# Patient Record
Sex: MALE | Race: WHITE | NOT HISPANIC OR LATINO | Employment: FULL TIME | URBAN - METROPOLITAN AREA
[De-identification: names, ages, dates, MRNs, and addresses within clinical notes are randomized per-mention and may not be internally consistent; named-entity substitution may affect disease eponyms.]

---

## 2017-08-25 ENCOUNTER — OFFICE VISIT (OUTPATIENT)
Dept: MEDICAL GROUP | Facility: PHYSICIAN GROUP | Age: 52
End: 2017-08-25
Payer: COMMERCIAL

## 2017-08-25 VITALS
WEIGHT: 193 LBS | TEMPERATURE: 98.2 F | HEART RATE: 83 BPM | RESPIRATION RATE: 12 BRPM | OXYGEN SATURATION: 95 % | HEIGHT: 72 IN | DIASTOLIC BLOOD PRESSURE: 82 MMHG | SYSTOLIC BLOOD PRESSURE: 130 MMHG | BODY MASS INDEX: 26.14 KG/M2

## 2017-08-25 DIAGNOSIS — Z12.11 SCREENING FOR COLON CANCER: ICD-10-CM

## 2017-08-25 DIAGNOSIS — J30.2 SEASONAL ALLERGIC RHINITIS, UNSPECIFIED ALLERGIC RHINITIS TRIGGER: ICD-10-CM

## 2017-08-25 DIAGNOSIS — E55.9 VITAMIN D DEFICIENCY: ICD-10-CM

## 2017-08-25 DIAGNOSIS — E11.9 TYPE 2 DIABETES MELLITUS WITHOUT COMPLICATION, WITHOUT LONG-TERM CURRENT USE OF INSULIN (HCC): ICD-10-CM

## 2017-08-25 DIAGNOSIS — E78.5 DYSLIPIDEMIA: ICD-10-CM

## 2017-08-25 DIAGNOSIS — G47.33 OSA (OBSTRUCTIVE SLEEP APNEA): ICD-10-CM

## 2017-08-25 DIAGNOSIS — E53.8 VITAMIN B12 DEFICIENCY: ICD-10-CM

## 2017-08-25 DIAGNOSIS — F41.9 ANXIETY: ICD-10-CM

## 2017-08-25 PROCEDURE — 99204 OFFICE O/P NEW MOD 45 MIN: CPT | Performed by: INTERNAL MEDICINE

## 2017-08-25 RX ORDER — ATORVASTATIN CALCIUM 40 MG/1
40 TABLET, FILM COATED ORAL DAILY
Qty: 90 TAB | Refills: 3 | Status: SHIPPED | OUTPATIENT
Start: 2017-08-25

## 2017-08-25 RX ORDER — TRIAMCINOLONE ACETONIDE 55 UG/1
2 SPRAY, METERED NASAL DAILY
COMMUNITY

## 2017-08-25 RX ORDER — VENLAFAXINE 75 MG/1
75 TABLET ORAL DAILY
Qty: 90 TAB | Refills: 3 | Status: SHIPPED | OUTPATIENT
Start: 2017-08-25 | End: 2018-05-18 | Stop reason: SDUPTHER

## 2017-08-25 RX ORDER — FEXOFENADINE HCL 180 MG/1
180 TABLET ORAL DAILY
COMMUNITY

## 2017-08-25 RX ORDER — OLOPATADINE HYDROCHLORIDE 665 UG/1
1 SPRAY NASAL 2 TIMES DAILY
Qty: 3 BOTTLE | Refills: 3 | Status: SHIPPED | OUTPATIENT
Start: 2017-08-25

## 2017-08-25 RX ORDER — VENLAFAXINE 75 MG/1
75 TABLET ORAL 3 TIMES DAILY
COMMUNITY
End: 2017-08-25 | Stop reason: SDUPTHER

## 2017-08-25 RX ORDER — OLOPATADINE HYDROCHLORIDE 665 UG/1
SPRAY NASAL
COMMUNITY
End: 2017-08-25 | Stop reason: SDUPTHER

## 2017-08-25 RX ORDER — HYDROCORTISONE ACETATE 0.5 %
2 CREAM (GRAM) TOPICAL 2 TIMES DAILY
COMMUNITY

## 2017-08-25 RX ORDER — ATORVASTATIN CALCIUM 40 MG/1
40 TABLET, FILM COATED ORAL NIGHTLY
COMMUNITY
End: 2017-08-25 | Stop reason: SDUPTHER

## 2017-08-25 RX ORDER — LANOLIN ALCOHOL/MO/W.PET/CERES
1000 CREAM (GRAM) TOPICAL DAILY
COMMUNITY

## 2017-08-25 ASSESSMENT — PATIENT HEALTH QUESTIONNAIRE - PHQ9: CLINICAL INTERPRETATION OF PHQ2 SCORE: 0

## 2017-08-25 NOTE — PROGRESS NOTES
Chief Complaint   Patient presents with   • New Patient     establish care, med refills         HISTORY OF THE PRESENT ILLNESS: Patient is a 52 y.o. male. This pleasant patient is here today for DLD, alleriges, T2DM, anxiety, vitamin B12 deficiency    Dyslipidemia  Pt is on atorvastatin 40 mg daily and ASA daily for this. Patient is taking medication as prescribed. Patient denies myalgias. No history of MI or CVA.    Seasonal allergies  Pt has known seasonal allergies. He was being seen by ENT for this in the past (moved here from [place] in April of this year) Pt is on nasacort and olopatadine nasal sprays for this. He has had 2 sinus surgeries for this in the past. Since moving to this area he is doing fairly well. He takes allegra 4 times per week as an adjunct.     Type 2 diabetes mellitus without complication, without long-term current use of insulin (CMS-[facility])  Pt states he was diagnosed with T2DM by his last provider due to an A1C of 6.5%. He states that he was started on metformin 500 mg daily and had improvement in labs. He states he ran out of medication in March (5 months ago).     Pt reports he gets occasional neuropathy but this was attributed to vitamin B12 deficiency. He denies blurry vision, increased urinary frequency. Pt is on baby aspirin, statin.     Anxiety  Pt has known anxiety. He is on venlafaxine for this and has been on it for 10 years. It works well for him and he has difficulty when he misses a dose. Denies suicidal ideation.     Vitamin B12 deficiency  Pt is on vitamin B12 supplementation for diagnosis of vitamin B12 deficiency.       Allergies: Review of patient's allergies indicates no known allergies.    Current Outpatient Prescriptions Ordered in [facility]   Medication Sig Dispense Refill   • triamcinolone (NASACORT ALLERGY 24HR) 55 MCG/ACT nasal inhaler Spray 2 Sprays in nose every day.     • Glucosamine-Chondroit-Vit C-Mn (GLUCOSAMINE CHONDR 1500 COMPLX) Cap Take 2 Caps by mouth 2 Times  "a Day.     • Multiple Vitamin (MULTI VITAMIN) Tab Take  by mouth.     • fexofenadine (ALLEGRA) 180 MG tablet Take 180 mg by mouth every day.     • aspirin EC (ECOTRIN) 81 MG Tablet Delayed Response Take 81 mg by mouth every day.     • Cyanocobalamin (VITAMIN B-12) 1000 MCG Tab Take 1,000 mcg by mouth every day.     • vitamin D (CHOLECALCIFEROL) 1000 UNIT Tab Take 1,000 Units by mouth every day.     • atorvastatin (LIPITOR) 40 MG Tab Take 1 Tab by mouth every day. 90 Tab 3   • metformin (GLUCOPHAGE) 500 MG Tab Take 1 Tab by mouth every day. 90 Tab 3   • venlafaxine (EFFEXOR) 75 MG Tab Take 1 Tab by mouth every day. 90 Tab 3   • Olopatadine HCl 0.6 % Solution Spray 1 Spray in nose 2 Times a Day. 3 Bottle 3     No current Epic-ordered facility-administered medications on file.       Past Medical History   Diagnosis Date   • Hyperlipidemia    • Anxiety    • Allergy        Past Surgical History   Procedure Laterality Date   • Other       sinus surgery X 2   • Laminotomy         Social History   Substance Use Topics   • Smoking status: Never Smoker    • Smokeless tobacco: Never Used   • Alcohol Use: Yes       Family History   Problem Relation Age of Onset   • Dementia Mother    • Heart Disease Father    • Psychiatry Sister      bipolar   • GI Brother      Crohn's       Review of Systems :    Denies suicidal ideation, denies myalgia    All other systems reviewed and are negative except as in HPI.      Exam: Blood pressure 130/82, pulse 83, temperature 36.8 °C (98.2 °F), resp. rate 12, height 1.829 m (6' 0.01\"), weight 87.544 kg (193 lb), SpO2 95 %.    Blood pressure 130/82, pulse 83, temperature 36.8 °C (98.2 °F), resp. rate 12, height 1.829 m (6' 0.01\"), weight 87.544 kg (193 lb), SpO2 95 %. Body mass index is 26.17 kg/(m^2).   Physical Exam:  Constitutional: Alert & oriented, no acute distress  Eye: Equal, round and reactive, conjunctiva clear, lids normal  ENMT: Lips without lesions, good dentition, oropharynx " clear  Neck: Trachea midline, no masses, no thyromegaly. No cervical or supraclavicular lymphadenopathy  Respiratory: Unlabored respiratory effort, lungs clear to auscultation, no wheezes, no ronchi  Cardiovascular: Normal S1, S2, no murmur, no edema  Skin: Warm, dry, good turgor, no rashes in visible areas  Neuro: No overt focal neurologic deficits  Psych: Normal mood and affect, judgement normal    Assessment/Plan  1. Dyslipidemia  Continue atorvastatin and will check lab  - atorvastatin (LIPITOR) 40 MG Tab; Take 1 Tab by mouth every day.  Dispense: 90 Tab; Refill: 3  - LIPID PROFILE; Future    2. Seasonal allergic rhinitis, unspecified allergic rhinitis trigger  Well controlled on current regimen of nasacort, opapatadine, and allegra. Continue current regimen. If symptoms worsen will consider ENT referral  - Olopatadine HCl 0.6 % Solution; Spray 1 Spray in nose 2 Times a Day.  Dispense: 3 Bottle; Refill: 3    3. Anxiety  Well controlled. Continue venlafaxine. Denies suicidal ideation  - venlafaxine (EFFEXOR) 75 MG Tab; Take 1 Tab by mouth every day.  Dispense: 90 Tab; Refill: 3    4. Type 2 diabetes mellitus without complication, without long-term current use of insulin (CMS-HCC)  Will get labs to further assess and restart patient on metformin. Will repeat A1C in 3 months (pt will have been back on medication) and follow up shortly after  - metformin (GLUCOPHAGE) 500 MG Tab; Take 1 Tab by mouth every day.  Dispense: 90 Tab; Refill: 3  - CBC WITH DIFFERENTIAL; Future  - COMP METABOLIC PANEL; Future  - MICROALBUMIN CREAT RATIO URINE; Future  - HEMOGLOBIN A1C; Future  - HEMOGLOBIN A1C; Future    5. Vitamin B12 deficiency  Continue supplement and check lab  - VITAMIN B12; Future    6. RIDDHI (obstructive sleep apnea)  Will refer to pulmonary  - REFERRAL TO PULMONOLOGY    7. Vitamin D deficiency  Continue supplement and check lab  - VITAMIN D,25 HYDROXY; Future    8. Screening for colon cancer  - REFERRAL TO GI FOR  COLONOSCOPY      Return in about 3 months (around 11/25/2017).    Please note that this dictation was created using voice recognition software. I have made every reasonable attempt to correct obvious errors, but I expect that there are errors of grammar and possibly content that I did not discover before finalizing the note.

## 2017-08-25 NOTE — ASSESSMENT & PLAN NOTE
Pt has known seasonal allergies. He was being seen by ENT for this in the past (moved here from [place] in April of this year) Pt is on nasacort and olopatadine nasal sprays for this. He has had 2 sinus surgeries for this in the past. Since moving to this area he is doing fairly well. He takes allegra 4 times per week as an adjunct.

## 2017-08-25 NOTE — ASSESSMENT & PLAN NOTE
Pt states he was diagnosed with T2DM by his last provider due to an A1C of 6.5%. He states that he was started on metformin 500 mg daily and had improvement in labs. He states he ran out of medication in March (5 months ago).     Pt reports he gets occasional neuropathy but this was attributed to vitamin B12 deficiency. He denies blurry vision, increased urinary frequency. Pt is on baby aspirin, statin.

## 2017-08-25 NOTE — Clinical Note
[facility]  Samy Quinteros M.D.  [address]  [place] [address]  Fax: [phone]   Authorization for Release/Disclosure of   Protected Health Information   Name: SHAYY WANG : 1965 SSN: [id number]   Address: [address] [place] [address] [place] [address] Phone:    [phone] (home)    I authorize the entity listed below to release/disclose the PHI below to:   [facility]/Samy Quinteros M.D. and Samy Quinteros M.D.   Provider or Entity Name:  Jorge L Aguayo,    Address   [facility], Zip  [address], [place] [address] Phone:  [phone]    Fax:     Reason for request: continuity of care   Information to be released:    [  ] LAST COLONOSCOPY,  including any PATH REPORT and follow-up  [  ] LAST FIT/COLOGUARD RESULT [  ] LAST DEXA  [  ] LAST MAMMOGRAM  [  ] LAST PAP  [  ] LAST LABS [  ] RETINA EXAM REPORT  [  ] IMMUNIZATION RECORDS  [  ] Release all info      [  ] Check here and initial the line next to each item to release ALL health information INCLUDING  _____ Care and treatment for drug and / or alcohol abuse  _____ HIV testing, infection status, or AIDS  _____ Genetic Testing    DATES OF SERVICE OR TIME PERIOD TO BE DISCLOSED: _____________  I understand and acknowledge that:  * This Authorization may be revoked at any time by you in writing, except if your health information has already been used or disclosed.  * Your health information that will be used or disclosed as a result of you signing this authorization could be re-disclosed by the recipient. If this occurs, your re-disclosed health information may no longer be protected by State or Federal laws.  * You may refuse to sign this Authorization. Your refusal will not affect your ability to obtain treatment.  * This Authorization becomes effective upon signing and will  on (date) __________.      If no date is indicated, this Authorization will  one (1) year from the signature date.    Name: Shayy  Kirk    Signature:   Date:     8/25/2017       PLEASE FAX REQUESTED RECORDS BACK TO: [phone]

## 2017-08-25 NOTE — ASSESSMENT & PLAN NOTE
Pt has known anxiety. He is on venlafaxine for this and has been on it for 10 years. It works well for him and he has difficulty when he misses a dose. Denies suicidal ideation.

## 2017-08-25 NOTE — ASSESSMENT & PLAN NOTE
Pt is on atorvastatin 40 mg daily and ASA daily for this. Patient is taking medication as prescribed. Patient denies myalgias. No history of MI or CVA.

## 2017-09-02 LAB
25(OH)D3+25(OH)D2 SERPL-MCNC: 23.1 NG/ML (ref 30–100)
ALBUMIN SERPL-MCNC: 4.5 G/DL (ref 3.5–5.5)
ALBUMIN/CREAT UR: 4.1 MG/G CREAT (ref 0–30)
ALBUMIN/GLOB SERPL: 1.5 {RATIO} (ref 1.2–2.2)
ALP SERPL-CCNC: 65 IU/L (ref 39–117)
ALT SERPL-CCNC: 17 IU/L (ref 0–44)
AST SERPL-CCNC: 18 IU/L (ref 0–40)
BASOPHILS # BLD AUTO: 0 X10E3/UL (ref 0–0.2)
BASOPHILS NFR BLD AUTO: 0 %
BILIRUB SERPL-MCNC: 0.5 MG/DL (ref 0–1.2)
BUN SERPL-MCNC: 14 MG/DL (ref 6–24)
BUN/CREAT SERPL: 14 (ref 9–20)
CALCIUM SERPL-MCNC: 9.7 MG/DL (ref 8.7–10.2)
CHLORIDE SERPL-SCNC: 98 MMOL/L (ref 96–106)
CHOLEST SERPL-MCNC: 251 MG/DL (ref 100–199)
CO2 SERPL-SCNC: 24 MMOL/L (ref 18–29)
COMMENT 011824: ABNORMAL
CREAT SERPL-MCNC: 1.01 MG/DL (ref 0.76–1.27)
CREAT UR-MCNC: 115.4 MG/DL
EOSINOPHIL # BLD AUTO: 0 X10E3/UL (ref 0–0.4)
EOSINOPHIL NFR BLD AUTO: 1 %
ERYTHROCYTE [DISTWIDTH] IN BLOOD BY AUTOMATED COUNT: 13.9 % (ref 12.3–15.4)
GLOBULIN SER CALC-MCNC: 3 G/DL (ref 1.5–4.5)
GLUCOSE SERPL-MCNC: 123 MG/DL (ref 65–99)
HBA1C MFR BLD: 6.7 % (ref 4.8–5.6)
HCT VFR BLD AUTO: 45.9 % (ref 37.5–51)
HDLC SERPL-MCNC: 42 MG/DL
HGB BLD-MCNC: 14.7 G/DL (ref 12.6–17.7)
IMM GRANULOCYTES # BLD: 0 X10E3/UL (ref 0–0.1)
IMM GRANULOCYTES NFR BLD: 0 %
IMMATURE CELLS  115398: NORMAL
LDLC SERPL CALC-MCNC: 159 MG/DL (ref 0–99)
LYMPHOCYTES # BLD AUTO: 1.8 X10E3/UL (ref 0.7–3.1)
LYMPHOCYTES NFR BLD AUTO: 36 %
MCH RBC QN AUTO: 28.1 PG (ref 26.6–33)
MCHC RBC AUTO-ENTMCNC: 32 G/DL (ref 31.5–35.7)
MCV RBC AUTO: 88 FL (ref 79–97)
MICROALBUMIN UR-MCNC: 4.7 UG/ML
MONOCYTES # BLD AUTO: 0.5 X10E3/UL (ref 0.1–0.9)
MONOCYTES NFR BLD AUTO: 9 %
MORPHOLOGY BLD-IMP: NORMAL
NEUTROPHILS # BLD AUTO: 2.7 X10E3/UL (ref 1.4–7)
NEUTROPHILS NFR BLD AUTO: 54 %
NRBC BLD AUTO-RTO: NORMAL %
PLATELET # BLD AUTO: 246 X10E3/UL (ref 150–379)
POTASSIUM SERPL-SCNC: 4.3 MMOL/L (ref 3.5–5.2)
PROT SERPL-MCNC: 7.5 G/DL (ref 6–8.5)
RBC # BLD AUTO: 5.24 X10E6/UL (ref 4.14–5.8)
SODIUM SERPL-SCNC: 139 MMOL/L (ref 134–144)
TRIGL SERPL-MCNC: 249 MG/DL (ref 0–149)
VIT B12 SERPL-MCNC: 983 PG/ML (ref 211–946)
VLDLC SERPL CALC-MCNC: 50 MG/DL (ref 5–40)
WBC # BLD AUTO: 5 X10E3/UL (ref 3.4–10.8)

## 2017-09-05 ENCOUNTER — TELEPHONE (OUTPATIENT)
Dept: MEDICAL GROUP | Facility: PHYSICIAN GROUP | Age: 52
End: 2017-09-05

## 2017-09-05 NOTE — TELEPHONE ENCOUNTER
----- Message from Samy Quinteros M.D. sent at 9/4/2017  3:37 PM PDT -----  Labs confirm diabetes mellitus and high cholesterol. I recommend continuing current medications and trying to improve diet and get regular exercise. Labs also show a low vitamin D level, supplementation with vitamin D 2000 IU daily would help with this. If pt would like we can send a prescription in for this as well. Please call patient and inform them. Thank you  - Dr. Quinteros

## 2017-09-29 ENCOUNTER — OFFICE VISIT (OUTPATIENT)
Dept: MEDICAL GROUP | Facility: PHYSICIAN GROUP | Age: 52
End: 2017-09-29
Payer: COMMERCIAL

## 2017-09-29 VITALS
RESPIRATION RATE: 16 BRPM | HEART RATE: 98 BPM | TEMPERATURE: 97.2 F | BODY MASS INDEX: 26.82 KG/M2 | DIASTOLIC BLOOD PRESSURE: 90 MMHG | HEIGHT: 72 IN | SYSTOLIC BLOOD PRESSURE: 168 MMHG | OXYGEN SATURATION: 75 % | WEIGHT: 198 LBS

## 2017-09-29 DIAGNOSIS — J01.10 ACUTE NON-RECURRENT FRONTAL SINUSITIS: ICD-10-CM

## 2017-09-29 PROCEDURE — 99214 OFFICE O/P EST MOD 30 MIN: CPT | Performed by: NURSE PRACTITIONER

## 2017-09-29 RX ORDER — PROMETHAZINE HYDROCHLORIDE AND CODEINE PHOSPHATE 6.25; 1 MG/5ML; MG/5ML
5 SYRUP ORAL 4 TIMES DAILY PRN
Qty: 120 ML | Refills: 0 | Status: SHIPPED
Start: 2017-09-29 | End: 2017-12-15

## 2017-09-29 RX ORDER — CEFDINIR 300 MG/1
300 CAPSULE ORAL 2 TIMES DAILY
Qty: 14 CAP | Refills: 0 | Status: SHIPPED | OUTPATIENT
Start: 2017-09-29 | End: 2017-12-15

## 2017-09-29 NOTE — PROGRESS NOTES
Chief Complaint   Patient presents with   • Sinus Problem     x 5 days       HPI:      Osvaldo Bradley is a 52 y.o. male here today for sinus infection. Symptoms started Monday with sore throat, and Tuesday thick drainage started. He has dull headache and malaise. Cough is severe at night. Sore throat persists, nasal congestion and frontal headache persist, and he feels unwell. States symptoms are worsening.   Denies any F/C. Denies sob, dyspnea, wheezing.    Treatments tried: neomed sinus rinse, OTC Sudafed or multi symptom day time cold. Also uses Nasacort and Zyprexa daily chronically with Allegra 4 days a week. He does have history of 2 sinus surgeries, last in 2010. Has not had sinus infection for about 2 years that needed to be treated with antibiotics.     Current medicines (including changes today)  Current Outpatient Prescriptions   Medication Sig Dispense Refill   • cefdinir (OMNICEF) 300 MG Cap Take 1 Cap by mouth 2 times a day. 14 Cap 0   • promethazine-codeine (PHENERGAN-CODEINE) 6.25-10 MG/5ML Syrup Take 5 mL by mouth 4 times a day as needed for Cough. 120 mL 0   • triamcinolone (NASACORT ALLERGY 24HR) 55 MCG/ACT nasal inhaler Spray 2 Sprays in nose every day.     • Glucosamine-Chondroit-Vit C-Mn (GLUCOSAMINE CHONDR 1500 COMPLX) Cap Take 2 Caps by mouth 2 Times a Day.     • Multiple Vitamin (MULTI VITAMIN) Tab Take  by mouth.     • fexofenadine (ALLEGRA) 180 MG tablet Take 180 mg by mouth every day.     • aspirin EC (ECOTRIN) 81 MG Tablet Delayed Response Take 81 mg by mouth every day.     • Cyanocobalamin (VITAMIN B-12) 1000 MCG Tab Take 1,000 mcg by mouth every day.     • vitamin D (CHOLECALCIFEROL) 1000 UNIT Tab Take 1,000 Units by mouth every day.     • atorvastatin (LIPITOR) 40 MG Tab Take 1 Tab by mouth every day. 90 Tab 3   • metformin (GLUCOPHAGE) 500 MG Tab Take 1 Tab by mouth every day. 90 Tab 3   • venlafaxine (EFFEXOR) 75 MG Tab Take 1 Tab by mouth every day. 90 Tab 3   • Olopatadine HCl  0.6 % Solution Spray 1 Spray in nose 2 Times a Day. 3 Bottle 3     No current facility-administered medications for this visit.      He  has a past medical history of Allergy; Anxiety; and Hyperlipidemia.  He  has a past surgical history that includes other and laminotomy.  Social History   Substance Use Topics   • Smoking status: Never Smoker   • Smokeless tobacco: Never Used   • Alcohol use Yes     Social History     Social History Narrative   • No narrative on file     Family History   Problem Relation Age of Onset   • Dementia Mother    • Heart Disease Father    • Psychiatry Sister      bipolar   • GI Brother      Crohn's     Family Status   Relation Status   • Mother    • Father    • Sister Alive   • Brother Alive     Health Maintenance Topics with due status: Overdue       Topic Date Due    IMM HEP B VACCINE 1965    DIABETES MONOFILAMENT / LE EXAM 1963    RETINAL SCREENING 1981    IMM DTaP/Tdap/Td Vaccine 1982    IMM PNEUMOCOCCAL 19-64 (ADULT) MEDIUM RISK SERIES 1982    COLONOSCOPY 2013    IMM INFLUENZA 2017        ROS  As stated in HPI      Objective:     Blood pressure (!) 168/90, pulse 98, temperature 36.2 °C (97.2 °F), resp. rate 16, height 1.829 m (6'), weight 89.8 kg (198 lb), SpO2 (!) 75 %. Body mass index is 26.85 kg/m².  Physical Exam:  Alert, oriented in no acute distress.  Eye contact is good, speech goal directed, affect bright.  HEENT: EOMI, conjunctiva non-injected, sclera non-icteric. No lid edema or eye drainage  Nares patent with mild edema and erythema. No discharge visible.   TTP over frontal sinuses.   Gross hearing intact   Chelsie pinna, external auditory canals, TM pearly gray with normal light reflex of right, left TM with effusion and decreased light reflex.  Oral mucous membranes pink and moist with no lesions. Oropharynx with erythema. No exudate  Neck: supple with no cervical or supraclavicular lymphadenopathy  Lungs:  unlabored, clear to auscultation bilaterally with good excursion.  CV: regular rate and rhythm  Skin: No rashes or lesions in visible areas        Assessment and Plan:   Assessment/Plan:  1. Acute non-recurrent frontal sinusitis  New, acute problem. D/t hx of chronic sinus issues and severity of current infection, will tx with antibiotics. Enc rest, hydration, Mucinex, and cough medication. Continue nasal sprays and steam inhalation    - cefdinir (OMNICEF) 300 MG Cap; Take 1 Cap by mouth 2 times a day.  Dispense: 14 Cap; Refill: 0  - promethazine-codeine (PHENERGAN-CODEINE) 6.25-10 MG/5ML Syrup; Take 5 mL by mouth 4 times a day as needed for Cough.  Dispense: 120 mL; Refill: 0       Follow up:  Return in about 1 week (around 10/6/2017), or if not improved.    Educated in proper administration of medication(s) ordered today including safety, possible SE, risks, benefits, rationale and alternatives to therapy.   Supportive care, differential diagnoses, and indications for immediate follow-up discussed with patient.    Pathogenesis of diagnosis discussed including typical length and natural progression.    Instructed to return to clinic or nearest emergency department for any change in condition, further concerns, or worsening of symptoms.  Patient states understanding of the plan of care and discharge instructions.    Please note that this dictation was created using voice recognition software. I have made every reasonable attempt to correct obvious errors, but I expect that there are errors of grammar and possibly content that I did not discover before finalizing the note.    Followup: Return in about 1 week (around 10/6/2017), or if not improved. sooner should new symptoms or problems arise.

## 2017-12-09 LAB — HBA1C MFR BLD: 6.6 % (ref 4.8–5.6)

## 2017-12-15 ENCOUNTER — SLEEP CENTER VISIT (OUTPATIENT)
Dept: SLEEP MEDICINE | Facility: MEDICAL CENTER | Age: 52
End: 2017-12-15
Payer: COMMERCIAL

## 2017-12-15 ENCOUNTER — OFFICE VISIT (OUTPATIENT)
Dept: MEDICAL GROUP | Facility: PHYSICIAN GROUP | Age: 52
End: 2017-12-15
Payer: COMMERCIAL

## 2017-12-15 VITALS
TEMPERATURE: 96.6 F | BODY MASS INDEX: 26.95 KG/M2 | OXYGEN SATURATION: 95 % | WEIGHT: 199 LBS | DIASTOLIC BLOOD PRESSURE: 80 MMHG | SYSTOLIC BLOOD PRESSURE: 140 MMHG | HEART RATE: 76 BPM | RESPIRATION RATE: 12 BRPM | HEIGHT: 72 IN

## 2017-12-15 VITALS
BODY MASS INDEX: 26.68 KG/M2 | DIASTOLIC BLOOD PRESSURE: 100 MMHG | RESPIRATION RATE: 18 BRPM | OXYGEN SATURATION: 95 % | HEIGHT: 72 IN | HEART RATE: 71 BPM | WEIGHT: 197 LBS | SYSTOLIC BLOOD PRESSURE: 140 MMHG | TEMPERATURE: 97.1 F

## 2017-12-15 DIAGNOSIS — Z23 NEED FOR VACCINATION: ICD-10-CM

## 2017-12-15 DIAGNOSIS — E78.5 DYSLIPIDEMIA: ICD-10-CM

## 2017-12-15 DIAGNOSIS — E11.9 TYPE 2 DIABETES MELLITUS WITHOUT COMPLICATION, WITHOUT LONG-TERM CURRENT USE OF INSULIN (HCC): ICD-10-CM

## 2017-12-15 DIAGNOSIS — G47.33 OBSTRUCTIVE SLEEP APNEA SYNDROME: ICD-10-CM

## 2017-12-15 DIAGNOSIS — J32.9 RECURRENT SINUS INFECTIONS: ICD-10-CM

## 2017-12-15 PROCEDURE — 99214 OFFICE O/P EST MOD 30 MIN: CPT | Mod: 25 | Performed by: NURSE PRACTITIONER

## 2017-12-15 PROCEDURE — 90686 IIV4 VACC NO PRSV 0.5 ML IM: CPT | Performed by: NURSE PRACTITIONER

## 2017-12-15 PROCEDURE — 99244 OFF/OP CNSLTJ NEW/EST MOD 40: CPT | Performed by: INTERNAL MEDICINE

## 2017-12-15 PROCEDURE — 90471 IMMUNIZATION ADMIN: CPT | Performed by: NURSE PRACTITIONER

## 2017-12-15 NOTE — ASSESSMENT & PLAN NOTE
Recurrent problem. Has had two sinus infections this year since moving to [place] in September and November. has hx of 2 sinus surgeries in the past for nasal polyps and sinusotomy.   He is on allegra 4 days a week. On olopatadine twice daily and nasocort once daily. Previously was on slit therapy not helping.

## 2017-12-15 NOTE — ASSESSMENT & PLAN NOTE
States he has some noticed some muscular pain since restarting atorvastatin. He did not notice it in the past, but this time around he does. It specifically occurs in the lower back. He tried cutting dose in half on his own about 1.5 month ago and has noticed some improved   Ref. Range 9/1/2017 08:21   Cholesterol,Tot Latest Ref Range: 100 - 199 mg/dL 251 (H)   Triglycerides Latest Ref Range: 0 - 149 mg/dL 249 (H)   HDL Latest Ref Range: >39 mg/dL 42   LDL Latest Ref Range: 0 - 99 mg/dL 159 (H)   VLDL Cholesterol Calc Latest Ref Range: 5 - 40 mg/dL 50 (H)

## 2017-12-15 NOTE — ASSESSMENT & PLAN NOTE
Controlled: yes  Medications: metformin 500 mg once daily for the past 4 months, although A1C has not really improved  Glucose at home: not monitoring   Hypoglycemia episodes: none  Statin: yes  ACEI/ARB: no  Aspirin: yes  Exercise: none  Nutrition counseling last: 2016, which did help him to learn how to carb count  Denies any polyuria, polydipsia, polyphagia, blurred or cloudy vision, rash or thrush, or numbness or tingling of feet.    Ref. Range 9/1/2017 08:21 12/8/2017 10:17   Glycohemoglobin Latest Ref Range: 4.8 - 5.6 % 6.7 (H) 6.6 (H)

## 2017-12-19 NOTE — PROGRESS NOTES
Subjective:     Chief Complaint   Patient presents with   • Follow-Up     3 months       HPI  Osvaldo Bradley is a 52 y.o. male here today for diabetes f/u     Type 2 diabetes mellitus without complication, without long-term current use of insulin (CMS-HCC)  Controlled: yes  Medications: metformin 500 mg once daily for the past 4 months, although A1C has not really improved  Glucose at home: not monitoring   Hypoglycemia episodes: none  Statin: yes  ACEI/ARB: no  Aspirin: yes  Exercise: none  Nutrition counseling last: 2016, which did help him to learn how to carb count  Denies any polyuria, polydipsia, polyphagia, blurred or cloudy vision, rash or thrush, or numbness or tingling of feet.    Ref. Range 9/1/2017 08:21 12/8/2017 10:17   Glycohemoglobin Latest Ref Range: 4.8 - 5.6 % 6.7 (H) 6.6 (H)       Dyslipidemia  States he has some noticed some muscular pain since restarting atorvastatin. He did not notice it in the past, but this time around he does. It specifically occurs in the lower back. He tried cutting dose in half on his own about 1.5 month ago and has noticed some improved   Ref. Range 9/1/2017 08:21   Cholesterol,Tot Latest Ref Range: 100 - 199 mg/dL 251 (H)   Triglycerides Latest Ref Range: 0 - 149 mg/dL 249 (H)   HDL Latest Ref Range: >39 mg/dL 42   LDL Latest Ref Range: 0 - 99 mg/dL 159 (H)   VLDL Cholesterol Calc Latest Ref Range: 5 - 40 mg/dL 50 (H)       Recurrent sinus infections  Recurrent problem. Has had two sinus infections this year since moving to [place] in September and November. has hx of 2 sinus surgeries in the past for nasal polyps and sinusotomy.   He is on allegra 4 days a week. On olopatadine twice daily and nasocort once daily. Previously was on slit therapy not helping.        Diagnoses of Type 2 diabetes mellitus without complication, without long-term current use of insulin (CMS-HCC), Dyslipidemia, Recurrent sinus infections, and Need for vaccination were pertinent to this  visit.    Allergies: Patient has no known allergies.  Current medicines (including changes today)  Current Outpatient Prescriptions   Medication Sig Dispense Refill   • triamcinolone (NASACORT ALLERGY 24HR) 55 MCG/ACT nasal inhaler Spray 2 Sprays in nose every day.     • fexofenadine (ALLEGRA) 180 MG tablet Take 180 mg by mouth every day.     • aspirin EC (ECOTRIN) 81 MG Tablet Delayed Response Take 81 mg by mouth every day.     • vitamin D (CHOLECALCIFEROL) 1000 UNIT Tab Take 1,000 Units by mouth every day.     • atorvastatin (LIPITOR) 40 MG Tab Take 1 Tab by mouth every day. (Patient taking differently: Take 20 mg by mouth every day.) 90 Tab 3   • metformin (GLUCOPHAGE) 500 MG Tab Take 1 Tab by mouth every day. 90 Tab 3   • venlafaxine (EFFEXOR) 75 MG Tab Take 1 Tab by mouth every day. 90 Tab 3   • Olopatadine HCl 0.6 % Solution Spray 1 Spray in nose 2 Times a Day. 3 Bottle 3   • Glucosamine-Chondroit-Vit C-Mn (GLUCOSAMINE CHONDR 1500 COMPLX) Cap Take 2 Caps by mouth 2 Times a Day.     • Multiple Vitamin (MULTI VITAMIN) Tab Take  by mouth.     • Cyanocobalamin (VITAMIN B-12) 1000 MCG Tab Take 1,000 mcg by mouth every day.       No current facility-administered medications for this visit.        He  has a past medical history of Allergic rhinitis; Allergy; Anxiety; Diabetes (CMS-[facility]); Hyperlipidemia; Nasal drainage; and Sleep apnea.      ROS  As stated in HPI and additionally  Neuro: No unusual headache or dizziness  CV: No chest pain, [place], LE edema  Pulm: No sob or dyspnea  Endo: see HPI      Objective:     Blood pressure 140/80, pulse 76, temperature 35.9 °C (96.6 °F), resp. rate 12, height 1.829 m (6'), weight 90.3 kg (199 lb), SpO2 95 %. Body mass index is 26.99 kg/m².  Physical Exam:  General: Alert, oriented, in no acute distress.  Eye contact is good, speech goal directed, affect calm  CNs grossly intact.  HEENT: conjunctiva non-injected, sclera non-icteric, EOMs intact. No lid edema or eye drainage.   Pinna  normal without skin lesions. TM pearly alvarez. Gross hearing intact.  Nasal turbinates without edema or drainage.   Oral mucous membranes pink and moist with no lesions. Oropharynx without erythema, or exudate.  Maxillary and frontal sinuses without TTP  Lungs: unlabored. clear to auscultation bilaterally with good excursion.  CV: regular rate and rhythm. No murmurs. No carotid bruits.   Ext: no edema  Skin: No rashes or lesions in visible areas  Gait steady.     Assessment and Plan:   Assessment/Plan:  1. Type 2 diabetes mellitus without complication, without long-term current use of insulin (CMS-[facility])  Stable. Continue metformin. Follow-up 3 months  - COMP METABOLIC PANEL; Future    2. Dyslipidemia  Lipid panel stated above was prior to him being on a atorvastatin. He has now been on this for about 3 months and we will obtain labs to determine if atorvastatin 20 mg is appropriate  - LIPID PROFILE; Future    3. Recurrent sinus infections  We discussed a option of watchful waiting versus referral to ENT. Since he has only had 2 sinus infections this year, we have decided to hold off on referral at this time and monitor for any repeat infection. If he experiences another one within the next 6 months, then we will obtain CT scan of sinuses and refer to ENT    4. Need for vaccination  I have placed the below orders and discussed them with an approved delegating provider. The MA is performing the below orders under the direction of Dr. Armendariz  - INFLUENZA VACCINE QUAD INJ >3Y(PF)       Follow up:  Return in about 3 months (around 3/15/2018).    Educated in proper administration of medication(s) ordered today including safety, possible SE, risks, benefits, rationale and alternatives to therapy.   Supportive care, differential diagnoses, and indications for immediate follow-up discussed with patient.    Pathogenesis of diagnosis discussed including typical length and natural progression.    Instructed to return to clinic or  nearest emergency department for any change in condition, further concerns, or worsening of symptoms.  Patient states understanding of the plan of care and discharge instructions.      Please note that this dictation was created using voice recognition software. I have made every reasonable attempt to correct obvious errors, but I expect that there are errors of grammar and possibly content that I did not discover before finalizing the note.    Followup: Return in about 3 months (around 3/15/2018). sooner should new symptoms or problems arise.

## 2017-12-22 ENCOUNTER — TELEPHONE (OUTPATIENT)
Dept: SLEEP MEDICINE | Facility: MEDICAL CENTER | Age: 52
End: 2017-12-22

## 2017-12-22 NOTE — TELEPHONE ENCOUNTER
Contacted [facility] - the [facility] dept closed @ 1900 EST.  Will f/up next week on the records request.

## 2017-12-22 NOTE — TELEPHONE ENCOUNTER
Patient following up on status of sleep records from [facility] in [place].  Advised none received yet, but will call them to verify receipt of VINH request.    Patient is anxious to get updated supplies; suggested ordering online for whichever parts are urgent through either [employer] or [employer].  They will ship without RX for parts.  Also, mailed copy of RX just in case it's needed for cash purchase through a local DME supplier, included business cards for [employer] and [employer].

## 2017-12-26 NOTE — PROGRESS NOTES
CC:  Establish care for known sleep apnea hypopnea syndrome.    HPI:   Mr. Bradley is a 52-year-old man referred by Dr. Samy Quinteros to assist in evaluation and management of known sleep apnea hypopnea syndrome.    In about 2005 he was evaluated in [facility] for snoring and excessive daytime somnolence.  He underwent a polysomnogram and was started on nocturnal CPAP therapy.  He underwent a follow-up polysomnogram about 4 years ago and he remains on CPAP therapy consistently.  He is using machine each night, throughout the night.  He is not having unusual problems with mask fit, leakage or airway dryness.    He has a regular sleep schedule with bedtime at about 10 PM and falls asleep quickly.  He awakens perhaps 3-4 times on an average night and arises at about 7 AM without fatigue, grogginess or morning headaches.  His wife reports that the snoring and apnea that he had before the initiation of CPAP therapy have completely resolved.  He is mildly tired during the day and may doze off during quiet moments.  His [place] sleepiness score is 9 points.  He is not drinking caffeinated beverages and does not use substances to report to maintain wakefulness.  He does not have symptoms suggesting narcolepsy, parasomnia or restless leg syndrome.    His medical history is remarkable for diabetes mellitus treated with metformin and for dyslipidemia.  He has a history of seasonal inhalation allergies but no history of known asthma.  The CPAP data recording chip information is reviewed with the patient.  It demonstrates use on 89 of the last 90 days with an average daily usage of 8 hours and 5 minutes.  Leak is minimal and the estimated residual apnea hypopnea index is 2.4 events per hour with a CPAP pressure of 8 cm water.        Patient Active Problem List    Diagnosis Date Noted   • Recurrent sinus infections 12/15/2017   • Dyslipidemia 08/25/2017   • Seasonal allergies 08/25/2017   • Anxiety 08/25/2017   • Type 2 diabetes  mellitus without complication, without long-term current use of insulin (CMS-HCC) 08/25/2017   • Vitamin B12 deficiency 08/25/2017   • RIDDHI on CPAP 08/25/2017   • Vitamin D deficiency 08/25/2017       Past Medical History:   Diagnosis Date   • Allergic rhinitis    • Allergy    • Anxiety    • Diabetes (CMS-HCC)    • Hyperlipidemia    • Nasal drainage    • Sleep apnea        Past Surgical History:   Procedure Laterality Date   • CARPAL TUNNEL RELEASE     • LAMINOTOMY     • OTHER      sinus surgery X 2       Family History   Problem Relation Age of Onset   • Dementia Mother    • Heart Disease Father    • Psychiatry Sister      bipolar   • GI Brother      Crohn's   • Sleep Apnea Neg Hx        Social History     Social History   • Marital status:      Spouse name: N/A   • Number of children: N/A   • Years of education: N/A     Occupational History   • Not on file.     Social History Main Topics   • Smoking status: Never Smoker   • Smokeless tobacco: Never Used   • Alcohol use Yes   • Drug use: No   • Sexual activity: Yes     Partners: Female     Other Topics Concern   • Not on file     Social History Narrative   • No narrative on file       Current Outpatient Prescriptions   Medication Sig Dispense Refill   • triamcinolone (NASACORT ALLERGY 24HR) 55 MCG/ACT nasal inhaler Spray 2 Sprays in nose every day.     • Glucosamine-Chondroit-Vit C-Mn (GLUCOSAMINE CHONDR 1500 COMPLX) Cap Take 2 Caps by mouth 2 Times a Day.     • Multiple Vitamin (MULTI VITAMIN) Tab Take  by mouth.     • aspirin EC (ECOTRIN) 81 MG Tablet Delayed Response Take 81 mg by mouth every day.     • Cyanocobalamin (VITAMIN B-12) 1000 MCG Tab Take 1,000 mcg by mouth every day.     • vitamin D (CHOLECALCIFEROL) 1000 UNIT Tab Take 1,000 Units by mouth every day.     • atorvastatin (LIPITOR) 40 MG Tab Take 1 Tab by mouth every day. (Patient taking differently: Take 20 mg by mouth every day.) 90 Tab 3   • metformin (GLUCOPHAGE) 500 MG Tab Take 1 Tab by  "mouth every day. 90 Tab 3   • venlafaxine (EFFEXOR) 75 MG Tab Take 1 Tab by mouth every day. 90 Tab 3   • Olopatadine HCl 0.6 % Solution Spray 1 Spray in nose 2 Times a Day. 3 Bottle 3   • fexofenadine (ALLEGRA) 180 MG tablet Take 180 mg by mouth every day.       No current facility-administered medications for this visit.     \"CURRENT RX\"      Allergies: Patient has no known allergies.      ROS  Positive for the sleep, allergy and endocrine issues reviewed above.  He denies diplopia, tinnitus, heartburn or abdominal discomfort, chest pain or palpitations, persisting cough or unusual dyspnea.  All other aspects of the CPT review of systems process are negative as documented in the attached self history form.      Physical Exam:   /100   Pulse 71   Temp 36.2 °C (97.1 °F)   Resp 18   Ht 1.829 m (6')   Wt 89.4 kg (197 lb)   SpO2 95%   BMI 26.72 kg/m²    Head and neck examination demonstrates no mucosal lesion, purulent drainage or evident polyps. The pharynx is benign with a Mallampati III presentation. The neck is supple without thyromegaly. On chest examination there are symmetrical bilateral breath sounds without rales, wheezing or consolidation. On cardiac examination, the apical impulse and heart sounds are normal and the rhythm is regular. There is no murmur, gallop or rub and no jugular venous distention. The abdomen is soft with active bowel sounds and no palpable hepatosplenomegaly, mass, guarding or rebound. The extremities show no clubbing, cyanosis or edema and no signs of deep venous thrombosis. There is no warmth, redness, tenderness or palpable venous cord in the calves. The skin is clear, warm and dry. There is no unusual peripheral lymphadenopathy. Peripheral pulses are palpable in all 4 extremities. On neurologic examination, cranial nerve function is intact, motor tone is symmetrical, and the patient is alert, oriented and responsive.       Problems:  1. Obstructive sleep apnea " syndrome  He has a long history of obstructive sleep apnea hypopnea syndrome, successfully treated with nocturnal CPAP.  He is using the treatment each night, throughout the night, as confirmed by the data recording chip.  He enjoys reasonable levels of daytime alertness and the chip suggests a low residual apnea hypopnea index of 2.4 events per hour.  Continued effective treatment is required to maintain levels of daytime alertness and to reduce the cardiac and neurologic risks associated with untreated sleep apnea hypopnea.  Adjustment of airway pressure does not seem required at this time. His CPAP generator is more than 6 years old and needs replacement.      Plan:   1.  Mask fitting today. He did well with a medium Dreamware mask.    2.  Continue treatment with CPAP at 8 cm water pressure.  A prescription is written for a new CPAP generator as well as new mask and supplies as needed.    3.  Return visit here in about 3 months, bringing the new CPAP data recording chip with him.  He may drop into the technician clinic at anytime for assistance.    We appreciate the opportunity to assist in his care.

## 2017-12-28 NOTE — TELEPHONE ENCOUNTER
Called [facility], they don't have our records request. I refaxed as STAT. They said I can expect records in a couple hours/ap

## 2018-01-06 NOTE — TELEPHONE ENCOUNTER
Order faxed to DME:  [facility] /  [phone] / fax [phone]    Patient notified by voicemail/tz.

## 2018-03-30 ENCOUNTER — SLEEP CENTER VISIT (OUTPATIENT)
Dept: SLEEP MEDICINE | Facility: MEDICAL CENTER | Age: 53
End: 2018-03-30
Payer: COMMERCIAL

## 2018-03-30 VITALS
HEART RATE: 81 BPM | SYSTOLIC BLOOD PRESSURE: 130 MMHG | BODY MASS INDEX: 27.22 KG/M2 | WEIGHT: 201 LBS | OXYGEN SATURATION: 95 % | DIASTOLIC BLOOD PRESSURE: 80 MMHG | HEIGHT: 72 IN | RESPIRATION RATE: 15 BRPM

## 2018-03-30 DIAGNOSIS — G47.33 OSA ON CPAP: ICD-10-CM

## 2018-03-30 PROCEDURE — 99213 OFFICE O/P EST LOW 20 MIN: CPT | Performed by: NURSE PRACTITIONER

## 2018-03-30 NOTE — PROGRESS NOTES
Chief Complaint   Patient presents with   • Follow-Up     3 M / new pap         HPI: This patient is a 52 y.o. male, who presents for follow-up RIDDHI with compliance check.    He was seen in consultation December 15, 2017 for known sleep apnea. He recently relocated here from [facility]. He was diagnosed in [place] in 2005. He was started on CPAP therapy at that time and has remained on therapy. PSG from 2011 showed RDI of 33, minimum saturation 84. His machine and supplies were outdated, he obtained a new machine after his last visit. His old machine was set to a pressure of 8 cm H2O. He was ordered auto titrating CPAP in the range of 6-10 cm H2O. Compliance download over the past 30 days indicates 100 % compliance, average use of 8 hours 54 minutes per night, AHI of 2.8. His mask is very comfortable. He feels the pressures at times are too low. He's turned off the ramp up function.    Past Medical History:   Diagnosis Date   • Allergic rhinitis    • Allergy    • Anxiety    • Diabetes (CMS-[facility])    • Hyperlipidemia    • Nasal drainage    • Sleep apnea        Social History   Substance Use Topics   • Smoking status: Never Smoker   • Smokeless tobacco: Never Used   • Alcohol use Yes       Family History   Problem Relation Age of Onset   • Dementia Mother    • Heart Disease Father    • Psychiatry Sister      bipolar   • GI Brother      Crohn's   • Sleep Apnea Neg Hx        Current medications as of today   Current Outpatient Prescriptions   Medication Sig Dispense Refill   • triamcinolone (NASACORT ALLERGY 24HR) 55 MCG/ACT nasal inhaler Spray 2 Sprays in nose every day.     • Glucosamine-Chondroit-Vit C-Mn (GLUCOSAMINE CHONDR 1500 COMPLX) Cap Take 2 Caps by mouth 2 Times a Day.     • Multiple Vitamin (MULTI VITAMIN) Tab Take  by mouth.     • fexofenadine (ALLEGRA) 180 MG tablet Take 180 mg by mouth every day.     • aspirin EC (ECOTRIN) 81 MG Tablet Delayed Response Take 81 mg by mouth every day.     • Cyanocobalamin  (VITAMIN B-12) 1000 MCG Tab Take 1,000 mcg by mouth every day.     • vitamin D (CHOLECALCIFEROL) 1000 UNIT Tab Take 1,000 Units by mouth every day.     • atorvastatin (LIPITOR) 40 MG Tab Take 1 Tab by mouth every day. (Patient taking differently: Take 20 mg by mouth every day.) 90 Tab 3   • metformin (GLUCOPHAGE) 500 MG Tab Take 1 Tab by mouth every day. 90 Tab 3   • venlafaxine (EFFEXOR) 75 MG Tab Take 1 Tab by mouth every day. 90 Tab 3   • Olopatadine HCl 0.6 % Solution Spray 1 Spray in nose 2 Times a Day. 3 Bottle 3     No current facility-administered medications for this visit.        Allergies: Patient has no known allergies.    Blood pressure 130/80, pulse 81, resp. rate 15, height 1.829 m (6'), weight 91.2 kg (201 lb), SpO2 95 %.      ROS: As per HPI and otherwise negative if not stated.      Physical exam:   Constitutional: Well-nourished, well-developed, in no acute distress  Eyes: PERRL  Neck: supple, trachea midline  Respiratory: no intercostal retractions or accessory muscle use   Lungs auscultation: Clear to auscultation bilaterally  Cardiovascular: Regular rate rhythm no murmurs, rubs or gallops  Musculoskeletal: no clubbing or cyanosis  Skin: No rashes or lesions noted on exposed skin  Neuro: No focal deficit noted  Psychiatric: Oriented to time, person and place.     Diagnosis:  1. RIDDHI on CPAP  DME OTHER       Plan:  1. Continue CPAP nightly, increased pressure to 8-12 cm H2O. Verified ramp up function turned off.  2. Clean mask and tubing weekly  3. Replace supplies his insurance will allow  4. Follow-up here annually, sooner if needed  5. Follow-up with PCP for routine health maintenance

## 2018-04-06 ENCOUNTER — HOSPITAL ENCOUNTER (OUTPATIENT)
Dept: LAB | Facility: MEDICAL CENTER | Age: 53
End: 2018-04-06
Attending: NURSE PRACTITIONER
Payer: COMMERCIAL

## 2018-04-06 ENCOUNTER — HOSPITAL ENCOUNTER (OUTPATIENT)
Dept: LAB | Facility: MEDICAL CENTER | Age: 53
End: 2018-04-06
Attending: INTERNAL MEDICINE
Payer: COMMERCIAL

## 2018-04-06 DIAGNOSIS — E11.9 TYPE 2 DIABETES MELLITUS WITHOUT COMPLICATION, WITHOUT LONG-TERM CURRENT USE OF INSULIN (HCC): ICD-10-CM

## 2018-04-06 DIAGNOSIS — E53.8 VITAMIN B12 DEFICIENCY: ICD-10-CM

## 2018-04-06 DIAGNOSIS — E55.9 VITAMIN D DEFICIENCY: ICD-10-CM

## 2018-04-06 DIAGNOSIS — E78.5 DYSLIPIDEMIA: ICD-10-CM

## 2018-04-06 LAB
25(OH)D3 SERPL-MCNC: 26 NG/ML (ref 30–100)
ALBUMIN SERPL BCP-MCNC: 4.5 G/DL (ref 3.2–4.9)
ALBUMIN/GLOB SERPL: 1.3 G/DL
ALP SERPL-CCNC: 63 U/L (ref 30–99)
ALT SERPL-CCNC: 18 U/L (ref 2–50)
ANION GAP SERPL CALC-SCNC: 8 MMOL/L (ref 0–11.9)
AST SERPL-CCNC: 23 U/L (ref 12–45)
BASOPHILS # BLD AUTO: 0.2 % (ref 0–1.8)
BASOPHILS # BLD: 0.01 K/UL (ref 0–0.12)
BILIRUB SERPL-MCNC: 0.6 MG/DL (ref 0.1–1.5)
BUN SERPL-MCNC: 14 MG/DL (ref 8–22)
CALCIUM SERPL-MCNC: 9.4 MG/DL (ref 8.5–10.5)
CHLORIDE SERPL-SCNC: 102 MMOL/L (ref 96–112)
CHOLEST SERPL-MCNC: 193 MG/DL (ref 100–199)
CO2 SERPL-SCNC: 28 MMOL/L (ref 20–33)
CREAT SERPL-MCNC: 0.99 MG/DL (ref 0.5–1.4)
CREAT UR-MCNC: 36.4 MG/DL
EOSINOPHIL # BLD AUTO: 0.07 K/UL (ref 0–0.51)
EOSINOPHIL NFR BLD: 1.3 % (ref 0–6.9)
ERYTHROCYTE [DISTWIDTH] IN BLOOD BY AUTOMATED COUNT: 42.1 FL (ref 35.9–50)
EST. AVERAGE GLUCOSE BLD GHB EST-MCNC: 146 MG/DL
GLOBULIN SER CALC-MCNC: 3.5 G/DL (ref 1.9–3.5)
GLUCOSE SERPL-MCNC: 119 MG/DL (ref 65–99)
HBA1C MFR BLD: 6.7 % (ref 0–5.6)
HCT VFR BLD AUTO: 42.6 % (ref 42–52)
HDLC SERPL-MCNC: 45 MG/DL
HGB BLD-MCNC: 14.1 G/DL (ref 14–18)
IMM GRANULOCYTES # BLD AUTO: 0.01 K/UL (ref 0–0.11)
IMM GRANULOCYTES NFR BLD AUTO: 0.2 % (ref 0–0.9)
LDLC SERPL CALC-MCNC: 111 MG/DL
LYMPHOCYTES # BLD AUTO: 1.84 K/UL (ref 1–4.8)
LYMPHOCYTES NFR BLD: 35.2 % (ref 22–41)
MCH RBC QN AUTO: 29.3 PG (ref 27–33)
MCHC RBC AUTO-ENTMCNC: 33.1 G/DL (ref 33.7–35.3)
MCV RBC AUTO: 88.4 FL (ref 81.4–97.8)
MICROALBUMIN UR-MCNC: <0.7 MG/DL
MICROALBUMIN/CREAT UR: NORMAL MG/G (ref 0–30)
MONOCYTES # BLD AUTO: 0.46 K/UL (ref 0–0.85)
MONOCYTES NFR BLD AUTO: 8.8 % (ref 0–13.4)
NEUTROPHILS # BLD AUTO: 2.83 K/UL (ref 1.82–7.42)
NEUTROPHILS NFR BLD: 54.3 % (ref 44–72)
NRBC # BLD AUTO: 0 K/UL
NRBC BLD-RTO: 0 /100 WBC
PLATELET # BLD AUTO: 222 K/UL (ref 164–446)
PMV BLD AUTO: 11.5 FL (ref 9–12.9)
POTASSIUM SERPL-SCNC: 3.7 MMOL/L (ref 3.6–5.5)
PROT SERPL-MCNC: 8 G/DL (ref 6–8.2)
RBC # BLD AUTO: 4.82 M/UL (ref 4.7–6.1)
SODIUM SERPL-SCNC: 138 MMOL/L (ref 135–145)
TRIGL SERPL-MCNC: 186 MG/DL (ref 0–149)
VIT B12 SERPL-MCNC: 1288 PG/ML (ref 211–911)
WBC # BLD AUTO: 5.2 K/UL (ref 4.8–10.8)

## 2018-04-06 PROCEDURE — 36415 COLL VENOUS BLD VENIPUNCTURE: CPT

## 2018-04-06 PROCEDURE — 83036 HEMOGLOBIN GLYCOSYLATED A1C: CPT

## 2018-04-06 PROCEDURE — 82043 UR ALBUMIN QUANTITATIVE: CPT

## 2018-04-06 PROCEDURE — 82570 ASSAY OF URINE CREATININE: CPT

## 2018-04-06 PROCEDURE — 80061 LIPID PANEL: CPT

## 2018-04-06 PROCEDURE — 85025 COMPLETE CBC W/AUTO DIFF WBC: CPT

## 2018-04-06 PROCEDURE — 80053 COMPREHEN METABOLIC PANEL: CPT

## 2018-04-06 PROCEDURE — 82607 VITAMIN B-12: CPT

## 2018-04-06 PROCEDURE — 82306 VITAMIN D 25 HYDROXY: CPT

## 2018-04-27 ENCOUNTER — APPOINTMENT (OUTPATIENT)
Dept: MEDICAL GROUP | Facility: PHYSICIAN GROUP | Age: 53
End: 2018-04-27
Payer: COMMERCIAL

## 2018-05-18 ENCOUNTER — OFFICE VISIT (OUTPATIENT)
Dept: MEDICAL GROUP | Facility: PHYSICIAN GROUP | Age: 53
End: 2018-05-18
Payer: COMMERCIAL

## 2018-05-18 VITALS
SYSTOLIC BLOOD PRESSURE: 140 MMHG | BODY MASS INDEX: 27.09 KG/M2 | HEIGHT: 72 IN | HEART RATE: 78 BPM | DIASTOLIC BLOOD PRESSURE: 82 MMHG | OXYGEN SATURATION: 96 % | TEMPERATURE: 97.4 F | WEIGHT: 200 LBS

## 2018-05-18 DIAGNOSIS — M79.605 LEG PAIN, LATERAL, LEFT: ICD-10-CM

## 2018-05-18 DIAGNOSIS — E11.9 TYPE 2 DIABETES MELLITUS WITHOUT COMPLICATION, WITHOUT LONG-TERM CURRENT USE OF INSULIN (HCC): ICD-10-CM

## 2018-05-18 DIAGNOSIS — E78.5 DYSLIPIDEMIA: ICD-10-CM

## 2018-05-18 DIAGNOSIS — E53.8 VITAMIN B12 DEFICIENCY: ICD-10-CM

## 2018-05-18 DIAGNOSIS — I10 ESSENTIAL HYPERTENSION: ICD-10-CM

## 2018-05-18 DIAGNOSIS — E55.9 VITAMIN D DEFICIENCY: ICD-10-CM

## 2018-05-18 PROCEDURE — 99214 OFFICE O/P EST MOD 30 MIN: CPT | Performed by: NURSE PRACTITIONER

## 2018-05-18 RX ORDER — VENLAFAXINE 75 MG/1
75 TABLET ORAL DAILY
Qty: 90 TAB | Refills: 3 | Status: SHIPPED | OUTPATIENT
Start: 2018-05-18

## 2018-05-18 NOTE — PROGRESS NOTES
Subjective:     Chief Complaint   Patient presents with   • Vitamin D Deficiency     lab [facility]       HPI  Osvaldo Bradley is a 53 y.o. male here today for routine f/u.    He has pain in left lower lateral leg only when sitting on airplane for the past 6 months or so. Sitting with legs crossed increases the pain, as well as rolling ankle out laterally. The pain is lateral lower leg distal from knee down to ankle. Otherwise no pain. No erythema, edema, or warmth. No stiffness. No pain with walking     Type 2 diabetes mellitus without complication, without long-term current use of insulin (CMS-[facility])  Controlled: yes   Medications: metformin 500 mg daily  Statin: yes  ACEI/ARB: no  Exercise: walking mostly throughout the day   Nutrition counseling last: 2015 or so  Denies any polyuria, polydipsia, polyphagia, blurred or cloudy vision, rash or thrush, or numbness or tingling of feet. Last dilated retinal eye exam was July 2017   Ref. Range 9/1/2017 08:21 12/8/2017 10:17 4/6/2018 09:15   Glycohemoglobin Latest Ref Range: 0.0 - 5.6 % 6.7 (H) 6.6 (H) 6.7 (H)   Estim. Avg Glu Latest Units: mg/dL   146      Ref. Range 4/6/2018 09:19   Micro Alb Creat Ratio Latest Ref Range: 0 - 30 mg/g see below   Creatinine, Urine Latest Units: mg/dL 36.40   Microalbumin, Urine Random Latest Units: mg/dL <0.7       Dyslipidemia  Ongoing problem currently improved since he has restarted statin therapy. Unable to tolerate atorvastatin 40 mg due to myalgias, but while on 20 mg with CoQ10 he does not experience these.   Ref. Range 9/1/2017 08:21 4/6/2018 09:15   Cholesterol,Tot Latest Ref Range: 100 - 199 mg/dL 251 (H) 193   Triglycerides Latest Ref Range: 0 - 149 mg/dL 249 (H) 186 (H)   HDL Latest Ref Range: >=40 mg/dL 42 45   LDL Latest Ref Range: <100 mg/dL 159 (H) 111 (H)       Vitamin D deficiency  Since he saw his blood work, he has increased his vitamin D to 5,000 units daily    Ref. Range 9/1/2017 08:21 4/6/2018 09:15   25-Hydroxy   Vitamin  D 25 Latest Ref Range: 30 - 100 ng/mL 23.1 (L) 26 (L)       Vitamin B12 deficiency  Ongoing problem that is stable with B12   Ref. Range 9/1/2017 08:21 4/6/2018 09:15   Vitamin B12 -True Cobalamin Latest Ref Range: 211 - 911 pg/mL 983 (H) 1288 (H)       Essential hypertension  Ongoing problem. Not on any HTN medication.  Exercise: none  Caffeine: none  NSAIDs: none  Salt: does not add salt   Family hx: no HTN  Sleep apnea: controlled with CPAP    Vitals 8/25/2017 9/29/2017 12/15/2017 12/15/2017 3/30/2018   SYSTOLIC 130 168 140 140 130   DIASTOLIC 82 90 100 80 80   PULSE 83 98 71 76 81     Vitals 5/18/2018   SYSTOLIC 140   DIASTOLIC 82   PULSE 78        Diagnoses of Type 2 diabetes mellitus without complication, without long-term current use of insulin (HCC), Dyslipidemia, Essential hypertension, Vitamin D deficiency, Vitamin B12 deficiency, and Leg pain, lateral, left were pertinent to this visit.    Allergies: Patient has no known allergies.  Current medicines (including changes today)  Current Outpatient Prescriptions   Medication Sig Dispense Refill   • Coenzyme Q10 (CO Q 10 PO) Take  by mouth.     • venlafaxine (EFFEXOR) 75 MG Tab Take 1 Tab by mouth every day. 90 Tab 3   • metFORMIN (GLUCOPHAGE) 500 MG Tab Take 1 Tab by mouth every day. 90 Tab 3   • triamcinolone (NASACORT ALLERGY 24HR) 55 MCG/ACT nasal inhaler Spray 2 Sprays in nose every day.     • Glucosamine-Chondroit-Vit C-Mn (GLUCOSAMINE CHONDR 1500 COMPLX) Cap Take 2 Caps by mouth 2 Times a Day.     • Multiple Vitamin (MULTI VITAMIN) Tab Take  by mouth.     • fexofenadine (ALLEGRA) 180 MG tablet Take 180 mg by mouth every day.     • aspirin EC (ECOTRIN) 81 MG Tablet Delayed Response Take 81 mg by mouth every day.     • Cyanocobalamin (VITAMIN B-12) 1000 MCG Tab Take 1,000 mcg by mouth every day.     • vitamin D (CHOLECALCIFEROL) 1000 UNIT Tab Take 1,000 Units by mouth every day.     • atorvastatin (LIPITOR) 40 MG Tab Take 1 Tab by mouth every day.  (Patient taking differently: Take 20 mg by mouth every day.) 90 Tab 3   • Olopatadine HCl 0.6 % Solution Spray 1 Spray in nose 2 Times a Day. 3 Bottle 3     No current facility-administered medications for this visit.        He  has a past medical history of Allergic rhinitis; Allergy; Anxiety; Diabetes (HCC); Hyperlipidemia; Nasal drainage; and Sleep apnea.      ROS  As stated in HPI and additionally  Gen: No weight gain or loss  Neuro: No unusual headache  CV: No chest pain, [place], or LE edema  MSK: see HPI      Objective:     Blood pressure 140/82, pulse 78, temperature 36.3 °C (97.4 °F), height 1.829 m (6'), weight 90.7 kg (200 lb), SpO2 96 %. Body mass index is 27.12 kg/m².  Physical Exam:  General: Alert, oriented, in no acute distress.  Eye contact is good, speech goal directed, affect calm  CNs grossly intact.  HEENT: conjunctiva non-injected, sclera non-icteric, EOMs intact. No lid edema or eye drainage.   Gross hearing intact.  Oral mucous membranes pink and moist with no lesions. Oropharynx without erythema, or exudate.   Lungs: unlabored. clear to auscultation bilaterally with good excursion.  CV: regular rate and rhythm. No murmurs. No carotid bruits.   Ext: no edema, normal color and temperature.   MSK: Left lower leg without deformity, edema, erythema, or warmth. FAROM of knee and ankle. No TTP  Skin: No rashes or lesions in visible areas  Gait steady.     Assessment and Plan:   Assessment/Plan:  1. Type 2 diabetes mellitus without complication, without long-term current use of insulin (HCC)  Stable. Continue metformin once daily. Discussed option of increasing to twice daily to have better control as well as increasing physical activity. He will keep metformin at the same dose at this time and follow-up with PCP willingness to [place].  - metFORMIN (GLUCOPHAGE) 500 MG Tab; Take 1 Tab by mouth every day.  Dispense: 90 Tab; Refill: 3    2. Dyslipidemia  Improving. Continue statin therapy. Lipids due  in one year    3. Essential hypertension  Not controlled. Offered patient 3 months trial of increasing exercise with goal BP less than 130/80. Option 2 was starting lisinopril today. He does not want to do this at this time therefore this will be monitored again in 3 months.    4. Vitamin D deficiency  Increase vitamin D supplementation. Check labs with PCP and [place].    5. Vitamin B12 deficiency  Stable. Continue B12 as long as he is on metformin.    6. Leg pain, lateral, left  New problem. Physical exam essentially normal. Suspect muscle strain or some tendon inflammation. Encouraged Epsom salt baths alternating with heat. Ref to PT to see if he can be taught some home exercises  - REFERRAL TO PHYSICAL THERAPY Reason for Therapy: Eval/Treat/Report       Follow up:  Return in about 6 months (around 11/18/2018).    Educated in proper administration of medication(s) ordered today including safety, possible SE, risks, benefits, rationale and alternatives to therapy.   Supportive care, differential diagnoses, and indications for immediate follow-up discussed with patient.    Pathogenesis of diagnosis discussed including typical length and natural progression.    Instructed to return to clinic or nearest emergency department for any change in condition, further concerns, or worsening of symptoms.  Patient states understanding of the plan of care and discharge instructions.      Please note that this dictation was created using voice recognition software. I have made every reasonable attempt to correct obvious errors, but I expect that there are errors of grammar and possibly content that I did not discover before finalizing the note.    Followup: Return in about 6 months (around 11/18/2018). sooner should new symptoms or problems arise.

## 2018-05-18 NOTE — ASSESSMENT & PLAN NOTE
Ongoing problem that is stable with B12   Ref. Range 9/1/2017 08:21 4/6/2018 09:15   Vitamin B12 -True Cobalamin Latest Ref Range: 211 - 911 pg/mL 983 (H) 1288 (H)

## 2018-05-18 NOTE — ASSESSMENT & PLAN NOTE
Ongoing problem. Not on any HTN medication.  Exercise: none  Caffeine: none  NSAIDs: none  Salt: does not add salt   Family hx: no HTN  Sleep apnea: controlled with CPAP    Vitals 8/25/2017 9/29/2017 12/15/2017 12/15/2017 3/30/2018   SYSTOLIC 130 168 140 140 130   DIASTOLIC 82 90 100 80 80   PULSE 83 98 71 76 81     Vitals 5/18/2018   SYSTOLIC 140   DIASTOLIC 82   PULSE 78

## 2018-05-18 NOTE — ASSESSMENT & PLAN NOTE
Since he saw his blood work, he has increased his vitamin D to 5,000 units daily    Ref. Range 9/1/2017 08:21 4/6/2018 09:15   25-Hydroxy   Vitamin D 25 Latest Ref Range: 30 - 100 ng/mL 23.1 (L) 26 (L)

## 2018-05-18 NOTE — ASSESSMENT & PLAN NOTE
Controlled: yes   Medications: metformin 500 mg daily  Statin: yes  ACEI/ARB: no  Exercise: walking mostly throughout the day   Nutrition counseling last: 2015 or so  Denies any polyuria, polydipsia, polyphagia, blurred or cloudy vision, rash or thrush, or numbness or tingling of feet. Last dilated retinal eye exam was July 2017   Ref. Range 9/1/2017 08:21 12/8/2017 10:17 4/6/2018 09:15   Glycohemoglobin Latest Ref Range: 0.0 - 5.6 % 6.7 (H) 6.6 (H) 6.7 (H)   Estim. Avg Glu Latest Units: mg/dL   146      Ref. Range 4/6/2018 09:19   Micro Alb Creat Ratio Latest Ref Range: 0 - 30 mg/g see below   Creatinine, Urine Latest Units: mg/dL 36.40   Microalbumin, Urine Random Latest Units: mg/dL <0.7

## 2018-06-11 ENCOUNTER — APPOINTMENT (OUTPATIENT)
Dept: PHYSICAL THERAPY | Facility: REHABILITATION | Age: 53
End: 2018-06-11
Attending: NURSE PRACTITIONER
Payer: COMMERCIAL

## 2018-06-18 ENCOUNTER — APPOINTMENT (OUTPATIENT)
Dept: PHYSICAL THERAPY | Facility: REHABILITATION | Age: 53
End: 2018-06-18
Attending: NURSE PRACTITIONER
Payer: COMMERCIAL

## 2020-03-13 NOTE — ASSESSMENT & PLAN NOTE
Ongoing problem currently improved since he has restarted statin therapy. Unable to tolerate atorvastatin 40 mg due to myalgias, but while on 20 mg with CoQ10 he does not experience these.   Ref. Range 9/1/2017 08:21 4/6/2018 09:15   Cholesterol,Tot Latest Ref Range: 100 - 199 mg/dL 251 (H) 193   Triglycerides Latest Ref Range: 0 - 149 mg/dL 249 (H) 186 (H)   HDL Latest Ref Range: >=40 mg/dL 42 45   LDL Latest Ref Range: <100 mg/dL 159 (H) 111 (H)      Detail Level: Zone Anesthesia Type: 1% lidocaine with epinephrine Anesthesia Volume In Cc: 0.3 Price (Use Numbers Only, No Special Characters Or $): 25 Consent: Written consent obtained and the risks of skin tag removal was reviewed with the patient including but not limited to bleeding, pigmentary change, infection, pain, and remote possibility of scarring. Hemostasis: Aluminum Chloride Removed With: scissors

## 2021-03-15 DIAGNOSIS — Z23 NEED FOR VACCINATION: ICD-10-CM

## 2022-12-05 NOTE — MR AVS SNAPSHOT
"        Osvaldo Bradley   2017 10:40 AM   Office Visit   MRN: [id number]    Department:  [facility]   Dept Phone:  [phone]    Description:  Male : 1965   Provider:  Samy Quinteros M.D.           Reason for Visit     New Patient establish care, med refills      Allergies as of 2017     No Known Allergies      You were diagnosed with     Dyslipidemia   [173894]       Seasonal allergic rhinitis, unspecified allergic rhinitis trigger   [0834810]       Anxiety   [808049]       Type 2 diabetes mellitus without complication, without long-term current use of insulin (CMS-HCC)   [6103435]       Vitamin B12 deficiency   [273076]       RIDDHI (obstructive sleep apnea)   [094265]       Vitamin D deficiency   [3180880]       Screening for colon cancer   [780608]         Vital Signs     Blood Pressure Pulse Temperature Respirations Height Weight    130/82 mmHg 83 36.8 °C (98.2 °F) 12 1.829 m (6' 0.01\") 87.544 kg (193 lb)    Body Mass Index Oxygen Saturation Smoking Status             26.17 kg/m2 95% Never Smoker          Basic Information     Date Of Birth Sex Race Ethnicity Preferred Language    1965 Male White Non- English      Your appointments     Dec 08, 2017 12:00 PM   Established Patient with TOMA Vee   [facility] ([address] [id number] [phone]           You will be receiving a confirmation call a few days before your appointment from our automated call confirmation system.              Problem List              ICD-10-CM Priority Class Noted - Resolved    Dyslipidemia E78.5   2017 - Present    Seasonal allergies J30.2   2017 - Present    Anxiety F41.9   2017 - Present    Type 2 diabetes mellitus without complication, without long-term current use of insulin (CMS-HCC) E11.9   2017 - Present    Vitamin B12 deficiency E53.8   2017 - Present    RIDDHI (obstructive sleep apnea) G47.33   2017 - Present   " Vitamin D deficiency E55.9   8/25/2017 - Present      Health Maintenance        Date Due Completion Dates    IMM DTaP/Tdap/Td Vaccine (1 - Tdap) 4/25/1982 ---    COLONOSCOPY 4/25/2013 ---    IMM INFLUENZA (1) 9/1/2017 ---            Current Immunizations     No immunizations on file.      Below and/or attached are the medications your provider expects you to take. Review all of your home medications and newly ordered medications with your provider and/or pharmacist. Follow medication instructions as directed by your provider and/or pharmacist. Please keep your medication list with you and share with your provider. Update the information when medications are discontinued, doses are changed, or new medications (including over-the-counter products) are added; and carry medication information at all times in the event of emergency situations     Allergies:  No Known Allergies          Medications  Valid as of: August 25, 2017 - 11:10 AM    Generic Name Brand Name Tablet Size Instructions for use    Aspirin (Tablet Delayed Response) ECOTRIN 81 MG Take 81 mg by mouth every day.        Atorvastatin Calcium (Tab) LIPITOR 40 MG Take 1 Tab by mouth every day.        Cholecalciferol (Tab) cholecalciferol 1000 UNIT Take 1,000 Units by mouth every day.        Cyanocobalamin (Tab) vitamin B-12 1000 MCG Take 1,000 mcg by mouth every day.        Fexofenadine HCl (Tab) ALLEGRA 180 MG Take 180 mg by mouth every day.        Glucosamine-Chondroit-Vit C-Mn (Cap) GLUCOSAMINE CHONDR 1500 COMPLX  Take 2 Caps by mouth 2 Times a Day.        MetFORMIN HCl (Tab) GLUCOPHAGE 500 MG Take 1 Tab by mouth every day.        Multiple Vitamin (Tab) Multi Vitamin  Take  by mouth.        Olopatadine HCl (Solution) Olopatadine HCl 0.6 % Spray 1 Spray in nose 2 Times a Day.        Triamcinolone Acetonide (Aerosol) NASACORT 55 MCG/ACT Evans 2 Sprays in nose every day.        Venlafaxine HCl (Tab) EFFEXOR 75 MG Take 1 Tab by mouth every day.        .                   Medicines prescribed today were sent to:     [facility] - [place], [place] - [address]    [address] [address]    Phone: [phone] Fax: [phone]    Open 24 Hours?: No      Medication refill instructions:       If your prescription bottle indicates you have medication refills left, it is not necessary to call your provider’s office. Please contact your pharmacy and they will refill your medication.    If your prescription bottle indicates you do not have any refills left, you may request refills at any time through one of the following ways: The online [employer] system (except Urgent Care), by calling your provider’s office, or by asking your pharmacy to contact your provider’s office with a refill request. Medication refills are processed only during regular business hours and may not be available until the next business day. Your provider may request additional information or to have a follow-up visit with you prior to refilling your medication.   *Please Note: Medication refills are assigned a new Rx number when refilled electronically. Your pharmacy may indicate that no refills were authorized even though a new prescription for the same medication is available at the pharmacy. Please request the medicine by name with the pharmacy before contacting your provider for a refill.        Your To Do List     Future Labs/Procedures Complete By Expires    CBC WITH DIFFERENTIAL  As directed 8/25/2018    COMP METABOLIC PANEL  As directed 8/25/2018    HEMOGLOBIN A1C  As directed 8/25/2018    HEMOGLOBIN A1C  As directed 8/25/2018    LIPID PROFILE  As directed 8/25/2018    MICROALBUMIN CREAT RATIO URINE  As directed 8/26/2018    VITAMIN B12  As directed 8/25/2018    VITAMIN D,25 HYDROXY  As directed 8/25/2018      Referral     A referral request has been sent to our patient care coordination department. Please allow 3-5 business days for us to process this request  and contact you either by phone or mail. If you do not hear from us by the 5th business day, please call us at [phone].           [employer] Access Code: Activation code not generated  Current [employer] Status: Active           Attending Only